# Patient Record
Sex: FEMALE | Race: WHITE | URBAN - METROPOLITAN AREA
[De-identification: names, ages, dates, MRNs, and addresses within clinical notes are randomized per-mention and may not be internally consistent; named-entity substitution may affect disease eponyms.]

---

## 2019-03-13 ENCOUNTER — OFFICE VISIT (OUTPATIENT)
Dept: PRIMARY CARE | Facility: CLINIC | Age: 49
End: 2019-03-13
Payer: COMMERCIAL

## 2019-03-13 VITALS
TEMPERATURE: 98.2 F | HEART RATE: 48 BPM | OXYGEN SATURATION: 96 % | DIASTOLIC BLOOD PRESSURE: 62 MMHG | RESPIRATION RATE: 16 BRPM | WEIGHT: 129.8 LBS | HEIGHT: 66 IN | SYSTOLIC BLOOD PRESSURE: 112 MMHG | BODY MASS INDEX: 20.86 KG/M2

## 2019-03-13 DIAGNOSIS — E07.89 THYROID FULLNESS: ICD-10-CM

## 2019-03-13 DIAGNOSIS — Z00.00 ENCOUNTER FOR GENERAL ADULT MEDICAL EXAMINATION WITHOUT ABNORMAL FINDINGS: Primary | ICD-10-CM

## 2019-03-13 PROCEDURE — 99386 PREV VISIT NEW AGE 40-64: CPT | Mod: 25 | Performed by: FAMILY MEDICINE

## 2019-03-13 PROCEDURE — 90471 IMMUNIZATION ADMIN: CPT | Performed by: FAMILY MEDICINE

## 2019-03-13 PROCEDURE — 90715 TDAP VACCINE 7 YRS/> IM: CPT | Performed by: FAMILY MEDICINE

## 2019-03-13 ASSESSMENT — ENCOUNTER SYMPTOMS
VOMITING: 0
WEAKNESS: 0
EYE DISCHARGE: 0
SEIZURES: 0
FEVER: 0
ARTHRALGIAS: 0
FREQUENCY: 0
FATIGUE: 0
DYSPHORIC MOOD: 0
CONSTIPATION: 0
ADENOPATHY: 0
DIARRHEA: 0
NAUSEA: 0
COUGH: 0
SHORTNESS OF BREATH: 0
BLOOD IN STOOL: 0
SORE THROAT: 0
RHINORRHEA: 0
DYSURIA: 0
APPETITE CHANGE: 0
WHEEZING: 0
ABDOMINAL PAIN: 0
HEADACHES: 0
UNEXPECTED WEIGHT CHANGE: 0

## 2019-03-13 NOTE — PROGRESS NOTES
Ketty Beyer DO    [facility]  [address], [address]  [place] [address]  Phone: [phone]  Fax: [phone]     History of Present Illness   Subjective     Patient ID: Andria Salamanca is a 49 y.o. female.    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 No pertinent past medical history.    Past Surgical History:   Procedure Laterality Date   • DILATION AND CURETTAGE OF UTERUS      x 2 in 90s   • WISDOM TOOTH EXTRACTION         Family History   Problem Relation Age of Onset   • Cancer Mother         oral   • Cancer Father         esophageal   • Stroke Maternal Grandmother    • Brain cancer Maternal Grandfather    • Alcohol abuse Paternal Grandmother    • Liver disease Paternal Grandmother    • Heart attack Paternal Grandfather        Social History     Social History   • Marital status:      Spouse name: N/A   • Number of children: 3   • Years of education: N/A     Occupational History   • [employer]  part time      Social History Main Topics   • Smoking status: Never Smoker   • Smokeless tobacco: Never Used   • Alcohol use Yes      Comment: rarely   • Drug use: No   • Sexual  "activity: Not on file     Other Topics Concern   • Not on file     Social History Narrative   • No narrative on file      Allergies and Medications     No Known Allergies    No current outpatient prescriptions on file.   Review of Systems       Review of Systems   Constitutional: Negative for appetite change, fatigue, fever and unexpected weight change.   HENT: Negative for congestion, ear pain, hearing loss, rhinorrhea and sore throat.    Eyes: Negative for discharge and visual disturbance.   Respiratory: Negative for cough, shortness of breath and wheezing.    Cardiovascular: Negative for chest pain and leg swelling.   Gastrointestinal: Negative for abdominal pain, blood in stool, constipation, diarrhea, nausea and vomiting.   Endocrine: Negative for polyuria.   Genitourinary: Negative for decreased urine volume, dysuria, frequency, urgency and vaginal discharge.        Menses reg   Musculoskeletal: Negative for arthralgias.   Skin: Negative for rash.   Allergic/Immunologic: Negative for environmental allergies.   Neurological: Negative for seizures, weakness and headaches.   Hematological: Negative for adenopathy.   Psychiatric/Behavioral: Negative for behavioral problems and dysphoric mood.      Physical Examination       Objective     Vitals:    03/13/19 0924   BP: 112/62   Pulse: (!) 48   Resp: 16   Temp: 36.8 °C (98.2 °F)   SpO2: 96%       Wt Readings from Last 3 Encounters:   03/13/19 58.9 kg (129 lb 12.8 oz)       Body mass index is 21.27 kg/m².    Ht Readings from Last 3 Encounters:   03/13/19 1.664 m (5' 5.5\")       BP Readings from Last 3 Encounters:   03/13/19 112/62       Physical Exam   Constitutional: She is oriented to person, place, and time. She appears well-developed and well-nourished. She is cooperative. No distress.   HENT:   Head: Normocephalic and atraumatic.   Right Ear: Tympanic membrane and ear canal normal.   Left Ear: Tympanic membrane and ear canal normal.   Nose: Nose normal. "   Mouth/Throat: Oropharynx is clear and moist.   Eyes: Conjunctivae, EOM and lids are normal. Pupils are equal, round, and reactive to light.   Neck: Trachea normal. Neck supple. Thyromegaly (thyroid prominent mildly R more than L) present.   Cardiovascular: Normal rate, regular rhythm, normal heart sounds and intact distal pulses.    Pulses:       Dorsalis pedis pulses are 2+ on the right side, and 2+ on the left side.   Pulmonary/Chest: Effort normal and breath sounds normal. No respiratory distress. She has no wheezes.   Abdominal: Soft. Bowel sounds are normal. There is no tenderness.   Musculoskeletal: Normal range of motion. She exhibits no edema.   Lymphadenopathy:     She has no cervical adenopathy.   Neurological: She is alert and oriented to person, place, and time. She exhibits normal muscle tone. Coordination normal.   Normal strength and ROM in extrems   Skin: Skin is warm, dry and intact. No rash noted.   Psychiatric: She has a normal mood and affect. Her behavior is normal. Cognition and memory are normal.   Nursing note and vitals reviewed.     Laboratory Results     No results found for: WBC, HGB, HCT, MCV, PLT       Chemistry    No results found for: NA, K, CL, CO2, BUN, CREATININE, GLU No results found for: CALCIUM, ALKPHOS, AST, ALT, BILITOT         No results found for: GLUCOSE, CALCIUM, NA, K, CO2, CL, BUN, CREATININE    No results found for: CHOL  No results found for: HDL  No results found for: LDLCALC  No results found for: TRIG  No results found for: CHOLHDL    No results found for: TSH    No results found for: HGBA1C    No results found for: HEPCAB      Immunization History   Administered Date(s) Administered   • Tdap 03/13/2019         Health Maintenance Topics with due status: Overdue       Topic Date Due    DTaP, Tdap, and Td Vaccines 01/10/1988    Cervical Cancer Screening 01/10/1990    Influenza Vaccine 08/01/2018    Mammogram 01/10/2019    Colonoscopy 01/10/2019     Health  Maintenance Topics with due status: Completed / Addressed / Aged Out       Topic Last Completion Date    HPV Vaccines Aged Out    Meningococcal Vaccine Aged Out    HIB Vaccines Aged Out    IPV Vaccines Aged Out          Assessment and Plan       Assessment/Plan     Problem List Items Addressed This Visit        Other    Encounter for general adult medical examination without abnormal findings - Primary    Current Assessment & Plan     -Eat diet with regular meals including 2-4 fruit servings , 2-4 vegetable servings, whole grains and lean protien each day  -control portions of carbs and keep down fats and sugars in diet  -exercise for 150 mins per week of cardio or more and 1-2 days per week of something strengthening like yoga, pilates or lifting  -wear sunblock w SPF of 30 or higher  in sun to protect your skin, and reapply often  -avoid all tobacco products  -limit alcohol and caffiene  -aim to get 6-8 hrs of sleep each night  -see your eye doctor every 2-3 yrs  -see your dentist every 6-12 mos  -BW,scope ordered  -FU gyn and for mammo this spring  -TdaP given  FU for physical in one year               Relevant Orders    Lipid panel    Comprehensive metabolic panel    Tdap vaccine greater than or equal to 8yo IM (Completed)    Direct Access Colonoscopy BMMSA    Thyroid fullness    Current Assessment & Plan     BW and u/s ordered         Relevant Orders    TSH w reflex FT4    ULTRASOUND THYROID          Return in about 1 year (around 3/13/2020) for physical.    Orders Placed This Encounter   Procedures   • ULTRASOUND THYROID     Standing Status:   Future     Standing Expiration Date:   3/13/2020   • Tdap vaccine greater than or equal to 8yo IM   • Lipid panel     Standing Status:   Future     Number of Occurrences:   1     Standing Expiration Date:   3/13/2020   • Comprehensive metabolic panel     Standing Status:   Future     Number of Occurrences:   1     Standing Expiration Date:   3/13/2020   • TSH w reflex  FT4     Standing Status:   Future     Number of Occurrences:   1     Standing Expiration Date:   3/13/2020   • Direct Access Colonoscopy BMMSA     Standing Status:   Future     Standing Expiration Date:   3/13/2020     Scheduling Instructions:      Office Staff: fax order to [phone]                 Order Specific Question:   What is your preferred phone number between 9AM and 5PM?     Answer:   [phone]            Ketty Beyer,   3/13/2019

## 2019-03-13 NOTE — ASSESSMENT & PLAN NOTE
-Eat diet with regular meals including 2-4 fruit servings , 2-4 vegetable servings, whole grains and lean protien each day  -control portions of carbs and keep down fats and sugars in diet  -exercise for 150 mins per week of cardio or more and 1-2 days per week of something strengthening like yoga, pilates or lifting  -wear sunblock w SPF of 30 or higher  in sun to protect your skin, and reapply often  -avoid all tobacco products  -limit alcohol and caffiene  -aim to get 6-8 hrs of sleep each night  -see your eye doctor every 2-3 yrs  -see your dentist every 6-12 mos  -BW,scope ordered  -FU gyn and for mammo this spring  -TdaP given  FU for physical in one year

## 2019-03-23 LAB
ALBUMIN SERPL-MCNC: 4.5 G/DL (ref 3.5–5.5)
ALBUMIN/GLOB SERPL: 1.8 {RATIO} (ref 1.2–2.2)
ALP SERPL-CCNC: 51 IU/L (ref 39–117)
ALT SERPL-CCNC: 11 IU/L (ref 0–32)
AST SERPL-CCNC: 21 IU/L (ref 0–40)
BILIRUB SERPL-MCNC: 0.4 MG/DL (ref 0–1.2)
BUN SERPL-MCNC: 10 MG/DL (ref 6–24)
BUN/CREAT SERPL: 13 (ref 9–23)
CALCIUM SERPL-MCNC: 9.4 MG/DL (ref 8.7–10.2)
CHLORIDE SERPL-SCNC: 105 MMOL/L (ref 96–106)
CHOLEST SERPL-MCNC: 211 MG/DL (ref 100–199)
CO2 SERPL-SCNC: 21 MMOL/L (ref 20–29)
CREAT SERPL-MCNC: 0.77 MG/DL (ref 0.57–1)
GLOBULIN SER CALC-MCNC: 2.5 G/DL (ref 1.5–4.5)
GLUCOSE SERPL-MCNC: 88 MG/DL (ref 65–99)
HDLC SERPL-MCNC: 78 MG/DL
LAB CORP EGFR IF AFRICN AM: 104 ML/MIN/1.73
LAB CORP EGFR IF NONAFRICN AM: 90 ML/MIN/1.73
LDLC SERPL CALC-MCNC: 123 MG/DL (ref 0–99)
POTASSIUM SERPL-SCNC: 4.3 MMOL/L (ref 3.5–5.2)
PROT SERPL-MCNC: 7 G/DL (ref 6–8.5)
SODIUM SERPL-SCNC: 139 MMOL/L (ref 134–144)
T4 FREE SERPL-MCNC: 1.1 NG/DL (ref 0.82–1.77)
TRIGL SERPL-MCNC: 48 MG/DL (ref 0–149)
TSH SERPL DL<=0.005 MIU/L-ACNC: 1.37 UIU/ML (ref 0.45–4.5)
VLDLC SERPL CALC-MCNC: 10 MG/DL (ref 5–40)

## 2019-03-25 ENCOUNTER — TELEPHONE (OUTPATIENT)
Dept: PRIMARY CARE | Facility: CLINIC | Age: 49
End: 2019-03-25

## 2019-03-25 NOTE — TELEPHONE ENCOUNTER
----- Message from Obed Arteaga DO sent at 3/25/2019  9:18 AM EDT -----  Rosa, let Andria know Dr NOBLES is on vacation and I reviewed her lab-work, ALL STABLE, great!  Keep up the good work.  So far so good.

## 2019-05-16 DIAGNOSIS — Z00.00 ENCOUNTER FOR GENERAL ADULT MEDICAL EXAMINATION WITHOUT ABNORMAL FINDINGS: ICD-10-CM

## 2019-07-15 ENCOUNTER — OFFICE VISIT (OUTPATIENT)
Dept: PRIMARY CARE | Facility: CLINIC | Age: 49
End: 2019-07-15
Payer: COMMERCIAL

## 2019-07-15 ENCOUNTER — TELEPHONE (OUTPATIENT)
Dept: PRIMARY CARE | Facility: CLINIC | Age: 49
End: 2019-07-15

## 2019-07-15 VITALS
WEIGHT: 131 LBS | DIASTOLIC BLOOD PRESSURE: 60 MMHG | TEMPERATURE: 98.1 F | HEART RATE: 60 BPM | OXYGEN SATURATION: 99 % | BODY MASS INDEX: 21.05 KG/M2 | HEIGHT: 66 IN | SYSTOLIC BLOOD PRESSURE: 102 MMHG

## 2019-07-15 DIAGNOSIS — R21 SKIN RASH: Primary | ICD-10-CM

## 2019-07-15 PROCEDURE — 99213 OFFICE O/P EST LOW 20 MIN: CPT | Performed by: NURSE PRACTITIONER

## 2019-07-15 RX ORDER — KETOCONAZOLE 20 MG/G
CREAM TOPICAL DAILY
Qty: 60 G | Refills: 0 | Status: SHIPPED | OUTPATIENT
Start: 2019-07-15 | End: 2019-07-29

## 2019-07-15 ASSESSMENT — ENCOUNTER SYMPTOMS
CHILLS: 0
ARTHRALGIAS: 0
LIGHT-HEADEDNESS: 0
FATIGUE: 0
FEVER: 0
PALPITATIONS: 0
SHORTNESS OF BREATH: 0
WHEEZING: 0
SORE THROAT: 0
STRIDOR: 0
DIZZINESS: 0
CHEST TIGHTNESS: 0
COUGH: 0
DIAPHORESIS: 0
JOINT SWELLING: 0
RHINORRHEA: 0

## 2019-07-15 NOTE — ASSESSMENT & PLAN NOTE
· SEE HPI, ROS, PE.   · Patient evaluated with collaborating physician, Dr. Arteaga.   · Differentials include tinea versicolor (leading diagnosis/concern) and pityriasis rosea.  · Will treat specifically for concern for tinea versicolor/pityriasis versicolor - ketoconazole 2% cream prescribed for daily use x 14 days.   · Explained etiologies of both differentials.   · Reviewed red-flag symptoms w/ the patient.   · Will follow-up with patient in 2 weeks to evaluate improvement.   · Patient verbalized an understanding and agreed to treatment as described.

## 2019-07-15 NOTE — PROGRESS NOTES
"     Iwona Benedict, ALISSA, CRNP, FNP-BC  [facility]  [address]  [place] [address]  Phone: [phone]  Fax: [phone]     History of Present Illness     Subjective     Patient ID: Andria Salamanca is a 49 y.o. female.    50 yo female patient of Dr. Stanford, new to me, presenting today for evaluation of a rash. She first noticed the rash within the last week - her daughter was applying sunscreen to her back and noticed the light-pink/light erythematous-colored rash diffusely spread over the back. She states the rash is mildly itchy, and was definitely exacerbated by the sun and moisture. She has noticed no drainage, discharge, or bleeding. No evidence of the rash is present anywhere else on the body at this time. She describes an unclear recent history of a dermatitis-outbreak at the left breast/nipple, and she was worked-up for this to ensure there was no risk of inflammatory breast CA. All screening was negative, and her symptoms resolved. She had used a topical OTC antifungal (name and dosage unknown) for suspected intertriginous dermatitis, and began using this topical prep to the back. She has noticed minimal relief with the sporadic use of the topical for the last week.     She has noticed no serous drainage from the lesions. She denies noticing a \"herald patch\" or one original region. The rash has not spread to the distal extremities or other regions. She denies any back pain or decreased ROM. No history of psoriasis or eczema. Has a home gym, so no exposure to possible fungal pathogens at a local public gym. The rash is not painful. No numbness, tingling, or paresthesias.          Past Medical/Surgical/Family/Social History     The following have been reviewed and updated as appropriate in this visit:  Allergies  Meds  Problems         No past medical history on file.    Past Surgical History:   Procedure Laterality Date   • DILATION AND " "CURETTAGE OF UTERUS      x 2 in 90s   • WISDOM TOOTH EXTRACTION         Family History   Problem Relation Age of Onset   • Cancer Mother         oral   • Cancer Father         esophageal   • Stroke Maternal Grandmother    • Brain cancer Maternal Grandfather    • Alcohol abuse Paternal Grandmother    • Liver disease Paternal Grandmother    • Heart attack Paternal Grandfather        Social History     Social History   • Marital status:      Spouse name: N/A   • Number of children: 3   • Years of education: N/A     Occupational History   • [employer]  part time      Social History Main Topics   • Smoking status: Never Smoker   • Smokeless tobacco: Never Used   • Alcohol use Yes      Comment: rarely   • Drug use: No   • Sexual activity: Not on file     Other Topics Concern   • Not on file     Social History Narrative   • No narrative on file        Allergies and Medications     No Known Allergies    Current Outpatient Prescriptions   Medication Sig Dispense Refill   • ketoconazole (NIZORAL) 2 % cream Apply topically daily for 14 days. 60 g 0     No current facility-administered medications for this visit.         Review of Systems     Review of Systems   Constitutional: Negative for chills, diaphoresis, fatigue and fever.   HENT: Negative for congestion, postnasal drip, rhinorrhea, sneezing and sore throat.    Respiratory: Negative for cough, chest tightness, shortness of breath, wheezing and stridor.    Cardiovascular: Negative for chest pain, palpitations and leg swelling.   Musculoskeletal: Negative for arthralgias and joint swelling.   Skin: Positive for rash (see HPI for ROS details pertaining to rash. ).   Neurological: Negative for dizziness, syncope and light-headedness.        Physical Examination     Objective     Vitals:    07/15/19 1146   BP: 102/60   Pulse: 60   Temp: 36.7 °C (98.1 °F)   SpO2: 99%   Weight: 59.4 kg (131 lb)   Height: 1.664 m (5' 5.5\")       Wt Readings from Last 2 " "Encounters:   07/15/19 59.4 kg (131 lb)   03/13/19 58.9 kg (129 lb 12.8 oz)       Ht Readings from Last 2 Encounters:   07/15/19 1.664 m (5' 5.5\")   03/13/19 1.664 m (5' 5.5\")       BP Readings from Last 2 Encounters:   07/15/19 102/60   03/13/19 112/62       Physical Exam   Constitutional: She is oriented to person, place, and time. Vital signs are normal.  Non-toxic appearance. She does not have a sickly appearance. She does not appear ill. No distress.   HENT:   Head: Normocephalic and atraumatic.   Right Ear: Hearing and external ear normal.   Left Ear: Hearing and external ear normal.   Eyes: Conjunctivae and lids are normal. Lids are everted and swept, no foreign bodies found.   Neck: Trachea normal and phonation normal.   Neurological: She is alert and oriented to person, place, and time.   Skin: Skin is warm, dry and intact. Capillary refill takes less than 2 seconds. Rash noted. She is not diaphoretic.   Light pink/light erythematous rash diffusely spread over the majority of the back. Several patches are scaly and rough. No central clearing of lesions. No obvious herald patch. No 'Aurora tree' distribution noted at this time. Rash is intermittently pruritic. Has worsened over the last week. No drainage, discharge, or bleeding. No spread to distal extremities, anterior trunk, inguinal region, etc.    Psychiatric: She has a normal mood and affect. Her behavior is normal. Judgment and thought content normal.   Nursing note and vitals reviewed.       Laboratory Results     No results found for: WBC, HGB, HCT, MCV, PLT     Lab Results   Component Value Date    GLUCOSE 88 03/22/2019     03/22/2019    K 4.3 03/22/2019    CO2 21 03/22/2019     03/22/2019    BUN 10 03/22/2019    CREATININE 0.77 03/22/2019    ALKPHOS 51 03/22/2019    AST 21 03/22/2019    ALT 11 03/22/2019    BILITOT 0.4 03/22/2019    ALBUMIN 4.5 03/22/2019       Lab Results   Component Value Date    CHOL 211 (H) 03/22/2019     Lab " Results   Component Value Date    HDL 78 03/22/2019     Lab Results   Component Value Date    LDLCALC 123 (H) 03/22/2019     Lab Results   Component Value Date    TRIG 48 03/22/2019       Lab Results   Component Value Date    TSH 1.370 03/22/2019       No results found for: HGBA1C    No results found for: HEPCAB    No results found for: MICROALBCREA    Immunization History   Administered Date(s) Administered   • Tdap 03/13/2019       Health Maintenance Topics with due status: Overdue       Topic Date Due    Cervical Cancer Screening 01/10/1990    Zoster Vaccine 01/10/2019     Health Maintenance Topics with due status: Not Due       Topic Last Completion Date    DTaP, Tdap, and Td Vaccines 03/13/2019    Mammogram 04/08/2019    Colonoscopy 04/25/2019    Influenza Vaccine Not Due     Health Maintenance Topics with due status: Completed / Addressed / Aged Out       Topic Last Completion Date    Meningococcal ACWY Aged Out    Varicella Vaccines Aged Out    HIB Vaccines Aged Out    IPV Vaccines Aged Out    HPV Vaccines Aged Out    Pneumococcal Aged Out        Assessment and Plan     Assessment/Plan     Problem List Items Addressed This Visit     Skin rash - Primary    Current Assessment & Plan     · SEE HPI, ROS, PE.   · Patient evaluated with collaborating physician, Dr. Arteaga.   · Differentials include tinea versicolor (leading diagnosis/concern) and pityriasis rosea.  · Will treat specifically for concern for tinea versicolor/pityriasis versicolor - ketoconazole 2% cream prescribed for daily use x 14 days.   · Explained etiologies of both differentials.   · Reviewed red-flag symptoms w/ the patient.   · Will follow-up with patient in 2 weeks to evaluate improvement.   · Patient verbalized an understanding and agreed to treatment as described.          Relevant Medications    ketoconazole (NIZORAL) 2 % cream          Return if symptoms worsen or fail to improve within 2 weeks..    No orders of the defined types were  placed in this encounter.           Iwona Benedict DNP, CRNP, FNP-BC    7/15/2019

## 2019-07-15 NOTE — TELEPHONE ENCOUNTER
Spoke with patient let this patient know that I sent the script for her rash to her [facility] - the medication name is ketoconazole (an antifungal cream) - and it should be applied daily (not twice per day) for 2 weeks. I will call her in 2 weeks to check-in.

## 2019-07-29 ENCOUNTER — TELEPHONE (OUTPATIENT)
Dept: PRIMARY CARE | Facility: CLINIC | Age: 49
End: 2019-07-29

## 2019-07-29 NOTE — TELEPHONE ENCOUNTER
Attempted to call patient to check-in on symptoms, but busy tone persisted after 3 attempts. Will email over portal.

## 2020-01-01 NOTE — TELEPHONE ENCOUNTER
Jennifer, can you let this patient know that I sent the script for her rash to her [facility] - the medication name is ketoconazole (an antifungal cream) - and it should be applied daily (not twice per day) for 2 weeks. I will call her in 2 weeks to check-in.     Thank you!   [facility]  Follow-up, [address], [place] [address], [phone]

## 2021-08-13 ENCOUNTER — TELEPHONE (OUTPATIENT)
Dept: PRIMARY CARE | Facility: CLINIC | Age: 51
End: 2021-08-13

## 2021-08-13 DIAGNOSIS — R09.89 CHEST CONGESTION: ICD-10-CM

## 2021-08-13 DIAGNOSIS — R05.9 COUGH: Primary | ICD-10-CM

## 2021-08-13 NOTE — TELEPHONE ENCOUNTER
Pt started with dry cough, body aches, fever 3 days ago. She is using Tylenol for fever. No other medications at this time. She is nervous about pneumonia since her sister had severe pneumonia on her 7th day of COVID. She was rapid tested today and it was positive for COVID. Pt would like to know if she should be doing anything preventively to prevent symptoms from worsening.

## 2021-08-13 NOTE — TELEPHONE ENCOUNTER
Pt informed. She does have chest congestion, no SOB. She was curious about the prescription medications for COVID treatment. She stated someone who is in the group that she got COVID with was prescribed indomethacin and did really well with that. The patient is very worried about ending up in the hospital.

## 2021-08-13 NOTE — TELEPHONE ENCOUNTER
Have her continue to self monitor her symptoms and call if having any cough ,chest congestion or sob. She can continue to use otc tylenol /advil for her fevers and muscle aches . She should continue to self quarantine with in her home for the next 10 days . She does not fulfill criteria for monoclonal antibody infusion.

## 2021-08-13 NOTE — TELEPHONE ENCOUNTER
I can check cxr if she is having cough and chest congestion to screen for pneumonia . Have her continue to self monitor her symptoms. Would not rx indomethacin at this time

## 2023-03-20 ENCOUNTER — APPOINTMENT (OUTPATIENT)
Dept: URBAN - METROPOLITAN AREA CLINIC 203 | Age: 53
Setting detail: DERMATOLOGY
End: 2023-03-23

## 2023-03-20 DIAGNOSIS — L91.8 OTHER HYPERTROPHIC DISORDERS OF THE SKIN: ICD-10-CM

## 2023-03-20 DIAGNOSIS — L57.8 OTHER SKIN CHANGES DUE TO CHRONIC EXPOSURE TO NONIONIZING RADIATION: ICD-10-CM

## 2023-03-20 DIAGNOSIS — D22 MELANOCYTIC NEVI: ICD-10-CM

## 2023-03-20 DIAGNOSIS — L57.0 ACTINIC KERATOSIS: ICD-10-CM

## 2023-03-20 DIAGNOSIS — L82.1 OTHER SEBORRHEIC KERATOSIS: ICD-10-CM

## 2023-03-20 PROBLEM — D22.5 MELANOCYTIC NEVI OF TRUNK: Status: ACTIVE | Noted: 2023-03-20

## 2023-03-20 PROCEDURE — OTHER REASSURANCE: OTHER

## 2023-03-20 PROCEDURE — 99204 OFFICE O/P NEW MOD 45 MIN: CPT

## 2023-03-20 PROCEDURE — OTHER COUNSELING: OTHER

## 2023-03-20 PROCEDURE — OTHER SUNSCREEN RECOMMENDATIONS: OTHER

## 2023-03-20 PROCEDURE — OTHER PRESCRIPTION MEDICATION MANAGEMENT: OTHER

## 2023-03-20 ASSESSMENT — LOCATION DETAILED DESCRIPTION DERM
LOCATION DETAILED: LEFT SUPRAPUBIC SKIN
LOCATION DETAILED: NASAL DORSUM
LOCATION DETAILED: LEFT MEDIAL BREAST 11-12:00 REGION
LOCATION DETAILED: LEFT MEDIAL BREAST 10-11:00 REGION
LOCATION DETAILED: RIGHT MEDIAL UPPER BACK
LOCATION DETAILED: LEFT AXILLARY VAULT

## 2023-03-20 ASSESSMENT — LOCATION ZONE DERM
LOCATION ZONE: TRUNK
LOCATION ZONE: AXILLAE
LOCATION ZONE: NOSE

## 2023-03-20 ASSESSMENT — LOCATION SIMPLE DESCRIPTION DERM
LOCATION SIMPLE: RIGHT UPPER BACK
LOCATION SIMPLE: LEFT AXILLARY VAULT
LOCATION SIMPLE: NOSE
LOCATION SIMPLE: GROIN
LOCATION SIMPLE: LEFT BREAST

## 2023-03-20 ASSESSMENT — PAIN INTENSITY VAS: HOW INTENSE IS YOUR PAIN 0 BEING NO PAIN, 10 BEING THE MOST SEVERE PAIN POSSIBLE?: NO PAIN

## 2023-03-20 NOTE — PROCEDURE: PRESCRIPTION MEDICATION MANAGEMENT
Render In Strict Bullet Format?: No
Detail Level: Zone
Samples Given: [place] ro[place] posay adapalene 0.1%

## 2025-07-23 ENCOUNTER — APPOINTMENT (OUTPATIENT)
Age: 55
Setting detail: DERMATOLOGY
End: 2025-07-23

## 2025-07-23 DIAGNOSIS — B35.1 TINEA UNGUIUM: ICD-10-CM

## 2025-07-23 DIAGNOSIS — L57.8 OTHER SKIN CHANGES DUE TO CHRONIC EXPOSURE TO NONIONIZING RADIATION: ICD-10-CM

## 2025-07-23 DIAGNOSIS — D18.0 HEMANGIOMA: ICD-10-CM

## 2025-07-23 DIAGNOSIS — L81.4 OTHER MELANIN HYPERPIGMENTATION: ICD-10-CM

## 2025-07-23 DIAGNOSIS — D22 MELANOCYTIC NEVI: ICD-10-CM

## 2025-07-23 DIAGNOSIS — L82.1 OTHER SEBORRHEIC KERATOSIS: ICD-10-CM

## 2025-07-23 PROBLEM — D22.5 MELANOCYTIC NEVI OF TRUNK: Status: ACTIVE | Noted: 2025-07-23

## 2025-07-23 PROBLEM — D18.01 HEMANGIOMA OF SKIN AND SUBCUTANEOUS TISSUE: Status: ACTIVE | Noted: 2025-07-23

## 2025-07-23 PROCEDURE — 99203 OFFICE O/P NEW LOW 30 MIN: CPT

## 2025-07-23 PROCEDURE — OTHER OBSERVATION: OTHER

## 2025-07-23 PROCEDURE — OTHER PRESCRIPTION: OTHER

## 2025-07-23 PROCEDURE — OTHER SUNSCREEN RECOMMENDATIONS: OTHER

## 2025-07-23 PROCEDURE — OTHER COUNSELING: OTHER

## 2025-07-23 RX ORDER — EFINACONAZOLE 100 MG/ML
SOLUTION TOPICAL
Qty: 8 | Refills: 11 | Status: ERX | COMMUNITY
Start: 2025-07-23

## 2025-07-23 ASSESSMENT — LOCATION DETAILED DESCRIPTION DERM
LOCATION DETAILED: RIGHT INFERIOR UPPER BACK
LOCATION DETAILED: LOWER STERNUM
LOCATION DETAILED: RIGHT MID-UPPER BACK
LOCATION DETAILED: XIPHOID
LOCATION DETAILED: MIDDLE STERNUM
LOCATION DETAILED: RIGHT GREAT TOENAIL

## 2025-07-23 ASSESSMENT — LOCATION SIMPLE DESCRIPTION DERM
LOCATION SIMPLE: RIGHT UPPER BACK
LOCATION SIMPLE: RIGHT GREAT TOE
LOCATION SIMPLE: ABDOMEN
LOCATION SIMPLE: CHEST

## 2025-07-23 ASSESSMENT — LOCATION ZONE DERM
LOCATION ZONE: TRUNK
LOCATION ZONE: TOENAIL